# Patient Record
Sex: MALE | Race: WHITE | Employment: FULL TIME | URBAN - METROPOLITAN AREA
[De-identification: names, ages, dates, MRNs, and addresses within clinical notes are randomized per-mention and may not be internally consistent; named-entity substitution may affect disease eponyms.]

---

## 2018-11-24 ENCOUNTER — HOSPITAL ENCOUNTER (EMERGENCY)
Age: 23
Discharge: HOME | End: 2018-11-24
Attending: EMERGENCY MEDICINE

## 2018-11-24 ENCOUNTER — APPOINTMENT (OUTPATIENT)
Dept: GENERAL RADIOLOGY | Age: 23
End: 2018-11-24

## 2018-11-24 VITALS
TEMPERATURE: 98.2 F | DIASTOLIC BLOOD PRESSURE: 84 MMHG | SYSTOLIC BLOOD PRESSURE: 140 MMHG | HEART RATE: 71 BPM | HEIGHT: 71 IN | OXYGEN SATURATION: 99 % | WEIGHT: 221 LBS | BODY MASS INDEX: 30.94 KG/M2

## 2018-11-24 DIAGNOSIS — S60.032A CONTUSION OF LEFT MIDDLE FINGER WITHOUT DAMAGE TO NAIL, INITIAL ENCOUNTER: Primary | ICD-10-CM

## 2018-11-24 PROCEDURE — 6370000000 HC RX 637 (ALT 250 FOR IP): Performed by: PHYSICIAN ASSISTANT

## 2018-11-24 PROCEDURE — 73130 X-RAY EXAM OF HAND: CPT

## 2018-11-24 PROCEDURE — 99283 EMERGENCY DEPT VISIT LOW MDM: CPT

## 2018-11-24 RX ORDER — IBUPROFEN 400 MG/1
800 TABLET ORAL ONCE
Status: COMPLETED | OUTPATIENT
Start: 2018-11-24 | End: 2018-11-24

## 2018-11-24 RX ADMIN — IBUPROFEN 800 MG: 400 TABLET, FILM COATED ORAL at 12:56

## 2018-11-24 ASSESSMENT — PAIN DESCRIPTION - LOCATION: LOCATION: HAND

## 2018-11-24 ASSESSMENT — PAIN DESCRIPTION - ORIENTATION: ORIENTATION: LEFT

## 2018-11-24 ASSESSMENT — PAIN SCALES - GENERAL
PAINLEVEL_OUTOF10: 7
PAINLEVEL_OUTOF10: 7

## 2018-11-24 ASSESSMENT — PAIN DESCRIPTION - FREQUENCY: FREQUENCY: CONTINUOUS

## 2018-11-24 ASSESSMENT — ENCOUNTER SYMPTOMS: COLOR CHANGE: 0

## 2018-11-24 ASSESSMENT — PAIN DESCRIPTION - DESCRIPTORS: DESCRIPTORS: CONSTANT

## 2018-11-24 ASSESSMENT — PAIN DESCRIPTION - PAIN TYPE: TYPE: ACUTE PAIN

## 2018-11-24 NOTE — ED PROVIDER NOTES
ED Attending Attestation Note     Date of evaluation: 11/24/2018    This patient was seen by the advance practice provider. I have seen and examined the patient, agree with the workup, evaluation, management and diagnosis. The care plan has been discussed. My assessment reveals Mild swelling and tenderness of the left third PIP joint. The patient has intact flexion and extension at MCP, PIP, DIP.      Newton Hill MD  11/24/18 1244

## 2021-11-06 ENCOUNTER — APPOINTMENT (OUTPATIENT)
Dept: GENERAL RADIOLOGY | Age: 26
End: 2021-11-06
Payer: COMMERCIAL

## 2021-11-06 ENCOUNTER — HOSPITAL ENCOUNTER (EMERGENCY)
Age: 26
Discharge: HOME | End: 2021-11-06
Payer: COMMERCIAL

## 2021-11-06 VITALS
BODY MASS INDEX: 30.8 KG/M2 | TEMPERATURE: 98 F | DIASTOLIC BLOOD PRESSURE: 74 MMHG | WEIGHT: 220 LBS | OXYGEN SATURATION: 100 % | HEIGHT: 71 IN | RESPIRATION RATE: 16 BRPM | HEART RATE: 75 BPM | SYSTOLIC BLOOD PRESSURE: 127 MMHG
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
## 2021-11-06 DIAGNOSIS — S62.616A CLOSED DISPLACED FRACTURE OF PROXIMAL PHALANX OF RIGHT LITTLE FINGER, INITIAL ENCOUNTER: Primary | ICD-10-CM

## 2021-11-06 PROCEDURE — 73130 X-RAY EXAM OF HAND: CPT

## 2021-11-06 PROCEDURE — 6370000000 HC RX 637 (ALT 250 FOR IP): Performed by: PHYSICIAN ASSISTANT

## 2021-11-06 PROCEDURE — 99283 EMERGENCY DEPT VISIT LOW MDM: CPT

## 2021-11-06 RX ORDER — IBUPROFEN 800 MG/1
800 TABLET ORAL EVERY 8 HOURS PRN
Qty: 30 TABLET | Refills: 0 | Status: SHIPPED | OUTPATIENT
Start: 2021-11-06

## 2021-11-06 RX ORDER — ACETAMINOPHEN 500 MG
1000 TABLET ORAL ONCE
Status: COMPLETED | OUTPATIENT
Start: 2021-11-06 | End: 2021-11-06

## 2021-11-06 RX ORDER — IBUPROFEN 400 MG/1
800 TABLET ORAL ONCE
Status: COMPLETED | OUTPATIENT
Start: 2021-11-06 | End: 2021-11-06

## 2021-11-06 RX ADMIN — IBUPROFEN 800 MG: 400 TABLET, FILM COATED ORAL at 17:23

## 2021-11-06 RX ADMIN — ACETAMINOPHEN 1000 MG: 500 TABLET ORAL at 17:23

## 2021-11-06 ASSESSMENT — ENCOUNTER SYMPTOMS
APNEA: 0
NAUSEA: 0
EYE DISCHARGE: 0
BACK PAIN: 0
ABDOMINAL PAIN: 0
VOMITING: 0
EYE REDNESS: 0
SHORTNESS OF BREATH: 0
CHOKING: 0
FACIAL SWELLING: 0

## 2021-11-06 ASSESSMENT — PAIN DESCRIPTION - PAIN TYPE: TYPE: ACUTE PAIN

## 2021-11-06 ASSESSMENT — PAIN SCALES - GENERAL: PAINLEVEL_OUTOF10: 7
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
## 2021-11-07 NOTE — ED NOTES
Provider order placed for patient's discharge. Provider reviewed decision to discharge with the patient. Discharge paperwork and any prescriptions were reviewed with the patient. Patient verbalized understanding of discharge education and any prescriptions and has no further questions or further needs at this time. Patient left with all personal belongings and was stable upon departure. Patient thanked for choosing [facility] ([facility]) and informed to return should any need arise.          Elizabeth Sinclair RN  11/06/21 2004

## 2021-11-08 ENCOUNTER — TELEPHONE (OUTPATIENT)
Dept: ORTHOPEDIC SURGERY | Age: 26
End: 2021-11-08

## 2021-11-08 NOTE — TELEPHONE ENCOUNTER
Appointment Request     Patient requesting earlier appointment: Yes  Appointment offered to patient: THIS IS A FRACTURE.  NP/ R HAND ([facility]) FX/ XR/ ER VISIT 11/6/21 [facility]/ AUTO RELATED (3RD PARTY RESPONSIBILITY)/ SELF PAY $40   Patient Contact Number: [phone]

## 2021-11-10 ENCOUNTER — OFFICE VISIT (OUTPATIENT)
Dept: ORTHOPEDIC SURGERY | Age: 26
End: 2021-11-10

## 2021-11-10 ENCOUNTER — TELEPHONE (OUTPATIENT)
Dept: ORTHOPEDIC SURGERY | Age: 26
End: 2021-11-10

## 2021-11-10 VITALS — HEIGHT: 71 IN | BODY MASS INDEX: 30.8 KG/M2 | WEIGHT: 220 LBS | RESPIRATION RATE: 15 BRPM

## 2021-11-10 DIAGNOSIS — Z01.818 PRE-OP TESTING: Primary | ICD-10-CM

## 2021-11-10 DIAGNOSIS — S62.616A CLOSED DISPLACED FRACTURE OF PROXIMAL PHALANX OF RIGHT LITTLE FINGER, INITIAL ENCOUNTER: ICD-10-CM

## 2021-11-10 PROCEDURE — 99204 OFFICE O/P NEW MOD 45 MIN: CPT | Performed by: PHYSICIAN ASSISTANT

## 2021-11-10 NOTE — PATIENT INSTRUCTIONS
Pre-Operative Instructions    1. The night before your surgery, unless otherwise instructed, do not eat any food, drink any liquids, chew gum or mints after midnight. Abstain from alcohol for 24 hours prior to surgery. 2. You will be contacted by the Hospital the working day prior to your procedure to confirm your arrival time. 3. Patients under 25years of age must have a parent or legal guardian present to sign their consent and discharge paperwork. 4. On the day of surgery,  you will be seen pre-operatively by an anesthesiologist.     5. If you are having hand surgery, it is recommended that nail polish and acrylic nails be removed prior to surgery if possible. 6. Please bring cases for glasses, contact lenses, hearing aids or dentures. They will likely be removed prior to surgery. 7. Wear casual, loose-fitting and comfortable clothing. Consider that you may have a large dressing to fit under your clothing after surgery. 9. Please do not bring valuables such as jewelry or large sums of cash to the hospital. Remove all body piercings before coming to the hospital. Gab Tang may not  wear any rings on the hand if you are having surgery on that hand, wrist or elbow. 10. Do not smoke or chew tobacco before your surgery. [address] [place] [address] and surgery facilities are smoke-free environments. Smoking is not permitted anywhere on campus. 11. Be sure to follow any additional instructions from your physician. If the above conditions are not met, your surgery may be cancelled and rescheduled for another day. Should you develop any change in your health such as fever, cough, sore throat, cold, flu, or infection, or if you have any questions regarding your Pre-admission or surgery, please contact [address] - Surgery Scheduling at [phone], Monday through Friday, 9 a.m. to 5 p.m.
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
## 2021-11-10 NOTE — LETTER
[address] [facility]  [address]  Phone: [phone]  Fax: [phone]    Jose Miguel Ley PA-C        November 10, 2021     Patient: Alec Kirby   YOB: 1995   Date of Visit: 11/10/2021       To Whom it May Concern:    Angel Lowery was seen in my clinic on 11/10/2021. If you have any questions or concerns, please don't hesitate to call.     Sincerely,         Jose Miguel Ley PA-C

## 2021-11-10 NOTE — LETTER
Recurrent Symptoms  - Anesthetic and/or Medical Risks  - We have discussed the specific limitations and risks of hospital and/or office based treatment at this time due to the COVID-19 pandemic                I have been counseled about the risks of rohit Covid-19 in the christopher-operative and post-operative periods related to this procedure. I have been made aware that rohit Covid-19 around the time of a surgical procedure may worsen my prognosis for recovering from the virus and lend to a higher morbidity and or mortality risk. With this knowledge, I have requested to proceed with the procedure as scheduled. 4. I have also been informed by the informing physician that there are other risks from both known and unknown causes that are attendant to the performance of any surgical procedure. I am aware that the practice of medicine and surgery is not an exact science, and that no guarantees have been made to me concerning the results of the operation and/or procedure(s). 5. I   CONSENT / REFUSE CONSENT  (strike the phrase that does not apply) to the taking of photographs before, during and/or after the operation or procedure for scientific/educational purposes. 6. I consent to the administration of anesthesia and to the use of such anesthetics as may be deemed advisable by the anesthesiologist who has been engaged by me or my physician. 7. I certify that I have read and understand the above consent to operation and/or other procedure(s); that the explanations therein referred to were made to me by the informing physician in advance of my signing this consent; that all blanks or statements requiring insertion or completion were filled in and inapplicable paragraphs, if any, were stricken before I signed; and that all questions asked by me about the operation and/or procedure(s) which I have consented to have been fully answered in a satisfactory manner. _______________________           11/10/21                              Witness     Signature Of Patient         Date        Padmini Mcwilliams                                                 Informing Physician                                           Signature of Informing Physician                              If patient is unable to sign or is a minor, complete one of the following:    (A)  Patient is a minor   years of age. (B)  Patient is unable to sign because: The undersigned represents that he or she is duly authorized to execute this consent for and on behalf of the above named patient. Witness               o  Parent  o  Guardian   o  Spouse       o  Other (specify)                                           Patient Name: Daniella Leger  Patient YOB: 1995  Dr. Chato Vera' Return To Work Policy  Regarding your ability to return to work after surgery or injury, Dr. Chato Vera will not state that any patient is off of work or cannot work at all. He will place you on restrictions after your surgical procedure or injury. Depending on the details of your particular situation, Dr. Chato Vera may state that you will have either light use or no use of your hand for a specific number of weeks. It is your obligation to communicate with your employer regarding your restrictions. It is your employer's decision as to whether they will accommodate your restrictions (i.e. allow you to come to work in your restricted capacity) or to not allow you to return to work under your restrictions. Dr. Chato Vera does not participate in making this decision and cannot influence your employer regarding their decision. If you do not communicate your restrictions to your employer, or if you do not present to work as you are scheduled to, Dr. Chato Vera will not provide an 'excuse' to explain your absence.   A doctors note, or official forms (BWC, FMLA, etc.) will be filled out, upon request, to indicate your date of surgery and your restrictions as stated above. Dr. Nestor Horowitz' Narcotic Policy  Patients will only be prescribed narcotics after surgical procedures or significant injury. Not all procedures cause pain great enough to require Narcotics and thus, not all patients will receive prescriptions after surgical procedures or injuries. Narcotics are never prescribed for chronic conditions. Narcotics are never prescribed for use longer than one week at a time. Refills are only granted in unusual circumstances and only at Dr. José Littlejohn discretion. Patients who are receiving narcotic medication from another physician or who are under pain management contracts will not be given a prescription for narcotics for any reason. Surgery Arrival Time:  You have been advised of the START TIME for your surgery as well as the ARRIVAL TIME at which you need to arrive at the surgery facility. Please understand that there is a certain amount of preparation which takes place at the surgery facility prior to the start of your surgery. If you arrive later than your scheduled ARRIVAL TIME, it may be necessary to cancel your surgery for that day and reschedule your procedure due to lack of adequate time for pre-surgery preparations. Thank you for being on time for your arrival.    I have read the above policies and understand that by agreeing to proceed with treatment by Dr. Annette Fam and his team, that I am agreeing to abide by these policies.   Patient Name:  Tahmina Kern    Patient Signature:  _____________________________    Brittani Locket Date:   11/10/21
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
## 2021-11-10 NOTE — LETTER
[facility] SURGERY  Surgery Scheduling Form:  DEMOGRAPHICS:                                                                                                              .  Patient Name:  Star Patel  Patient :  1995   Patient SS#:  [id number]    Patient Phone:  [phone] (home)  Alt. Patient Phone:    Patient Address:  [address] [address]    PCP:  No primary care provider on file. Insurance:  Payor: / No coverage found. Insurance ID Number:    DIAGNOSIS & PROCEDURE:                                                                                            .  Diagnosis:   right Small Finger Proximal Phalanx Fracture  (816.00)  s62.646a  Operation:  Closed Reduction  And Percutaneous Pinning of right Small Finger Proximal Phalanx Fracture  [CPT: 99948]  Location:  [facility]  Surgeon:  Soco Crawford    SCHEDULING INFORMATION:                                                                                         .    Surgeon's Scheduling Instruction:  within 7 days    RN Post-op Appt:  [] Yes   [x] No  Preferred Thursday:   [] Yes   [] No    Requested Date:  11-15  OR Time:  9:10 Patient Arrival Time:    OR Time Required:  30  Minutes  Anesthesia:  General  Equipment:  K-Wires, TPS  Mini C-Arm:  Yes   Standard C-Arm:  No  Status:  Outpatient                                COVID   RAPID  PAT Required:  Yes  Comments:  Monday 11/15 if still available                     Marshall Luna MD  11/10/21 10:50 AM    BILLING INFORMATION:                                                                                                    .    Procedure:       CPT Code Modifier  Closed Reduction and percutaneous pinning of right small finger and proximal phalanx Fracture    .                          Pre Operative Physician Prophylaxis Orders - SCIP Protocols      Pre-Operative Antibiotic Order:    Allergies   Allergen Reactions    Penicillins Swelling []  ----  No Antibiotic Ordered       [x]  ----  Give the following Antibiotic within 1 hour prior to start time:         Ancef 1 gram IV if patient is less than 200 pounds    or       Ancef 2 grams IV if patient is greater than 200 pounds    or      Vancomycin 1 gram IV (over 1 hour) if patient is allergic to           PENICILLINS or CEFALOSPORINS     surg    11-15                    covid   Rapid                          H&p use ER report       Procedure: Closed Reduction and Percutaneous Pinning of right Small Finger Proximal Phalanx Fracture     Patient: Lara Gilliam  :    1995    Physician Signature:     Date: 11/10/21  Time: 10:50 AM

## 2021-11-11 NOTE — PROGRESS NOTES
C-Difficile admission screening and protocol:     * Admitted with diarrhea? YES____    NO___X__     *Prior history of C-Diff. In last 3 months? YES____   NO__X___     *Antibiotic use in the past 6-8 weeks? NO__X____YES______                 If yes which  ANTIBIOTIC AND REASON______     *Prior hospitalization or nursing home in the last month?  YES____   NO__X__

## 2021-11-11 NOTE — PROGRESS NOTES
[facility] time____0755________        Surgery time_____0925_______    Take the following medications with a sip of water: Follow your MD/Surgeons pre-procedure instructions regarding your medications    Do not eat or drink anything after 12:00 midnight prior to your surgery. This includes water chewing gum, mints and ice chips. You may brush your teeth and gargle the morning of your surgery, but do not swallow the water     Please see your family doctor/pediatrician for a history and physical and/or concerning medications. Bring any test results/reports from your physicians office. If you are under the care of a heart doctor or specialist doctor, please be aware that you may be asked to them for clearance    You may be asked to stop blood thinners such as Coumadin, Plavix, Fragmin, Lovenox, etc., or any anti-inflammatories such as:  Aspirin, Ibuprofen, Advil, Naproxen prior to your surgery. We also ask that you stop any OTC medications such as fish oil, vitamin E, glucosamine, garlic, Multivitamins, COQ 10, etc.    We ask that you do not smoke 24 hours prior to surgery  We ask that you do not  drink any alcoholic beverages 24 hours prior to surgery     You must make arrangements for a responsible adult to take you home after your surgery. For your safety you will not be allowed to leave alone or drive yourself home. Your surgery will be cancelled if you do not have a ride home. Also for your safety, it is strongly suggested that someone stay with you the first 24 hours after your surgery. A parent or legal guardian must accompany a child scheduled for surgery and plan to stay at the hospital until the child is discharged. Please do not bring other children with you. For your comfort, please wear simple loose fitting clothing to the hospital.  Please do not bring valuables.     Do not wear any make-up or nail polish on your fingers or

## 2021-11-11 NOTE — PROGRESS NOTES
Pt allergic to PCN, Ancef ordered, consulted with Pharmacy regarding order and was instructed to order the alternative antibiotic Vancomycin since patient states \"just remember swelling up in fourth grade\"--unable to recall any other reactions

## 2021-11-12 ENCOUNTER — ANESTHESIA EVENT (OUTPATIENT)
Dept: OPERATING ROOM | Age: 26
End: 2021-11-12

## 2021-11-15 ENCOUNTER — ANESTHESIA (OUTPATIENT)
Dept: OPERATING ROOM | Age: 26
End: 2021-11-15

## 2021-11-15 ENCOUNTER — HOSPITAL ENCOUNTER (OUTPATIENT)
Age: 26
Setting detail: OUTPATIENT SURGERY
Discharge: HOME | End: 2021-11-15
Attending: ORTHOPAEDIC SURGERY | Admitting: ORTHOPAEDIC SURGERY

## 2021-11-15 ENCOUNTER — APPOINTMENT (OUTPATIENT)
Dept: GENERAL RADIOLOGY | Age: 26
End: 2021-11-15
Attending: ORTHOPAEDIC SURGERY

## 2021-11-15 VITALS
TEMPERATURE: 96.8 F | DIASTOLIC BLOOD PRESSURE: 66 MMHG | SYSTOLIC BLOOD PRESSURE: 122 MMHG | RESPIRATION RATE: 2 BRPM | OXYGEN SATURATION: 100 %

## 2021-11-15 VITALS
TEMPERATURE: 98.1 F | RESPIRATION RATE: 16 BRPM | DIASTOLIC BLOOD PRESSURE: 77 MMHG | WEIGHT: 210.1 LBS | BODY MASS INDEX: 29.41 KG/M2 | SYSTOLIC BLOOD PRESSURE: 123 MMHG | OXYGEN SATURATION: 96 % | HEART RATE: 60 BPM | HEIGHT: 71 IN

## 2021-11-15 DIAGNOSIS — S62.646A CLOSED NONDISPLACED FRACTURE OF PROXIMAL PHALANX OF RIGHT LITTLE FINGER, INITIAL ENCOUNTER: Primary | ICD-10-CM

## 2021-11-15 LAB — SARS-COV-2, NAAT: NOT DETECTED

## 2021-11-15 PROCEDURE — C1713 ANCHOR/SCREW BN/BN,TIS/BN: HCPCS | Performed by: ORTHOPAEDIC SURGERY

## 2021-11-15 PROCEDURE — 73120 X-RAY EXAM OF HAND: CPT

## 2021-11-15 PROCEDURE — 26746 TREAT FINGER FRACTURE EACH: CPT | Performed by: ORTHOPAEDIC SURGERY

## 2021-11-15 PROCEDURE — 6360000002 HC RX W HCPCS: Performed by: ANESTHESIOLOGY

## 2021-11-15 PROCEDURE — 2500000003 HC RX 250 WO HCPCS: Performed by: ORTHOPAEDIC SURGERY

## 2021-11-15 PROCEDURE — 7100000000 HC PACU RECOVERY - FIRST 15 MIN: Performed by: ORTHOPAEDIC SURGERY

## 2021-11-15 PROCEDURE — 3700000001 HC ADD 15 MINUTES (ANESTHESIA): Performed by: ORTHOPAEDIC SURGERY

## 2021-11-15 PROCEDURE — 3600000015 HC SURGERY LEVEL 5 ADDTL 15MIN: Performed by: ORTHOPAEDIC SURGERY

## 2021-11-15 PROCEDURE — 87635 SARS-COV-2 COVID-19 AMP PRB: CPT

## 2021-11-15 PROCEDURE — 3700000000 HC ANESTHESIA ATTENDED CARE: Performed by: ORTHOPAEDIC SURGERY

## 2021-11-15 PROCEDURE — 2500000003 HC RX 250 WO HCPCS: Performed by: NURSE ANESTHETIST, CERTIFIED REGISTERED

## 2021-11-15 PROCEDURE — 7100000011 HC PHASE II RECOVERY - ADDTL 15 MIN: Performed by: ORTHOPAEDIC SURGERY

## 2021-11-15 PROCEDURE — 2709999900 HC NON-CHARGEABLE SUPPLY: Performed by: ORTHOPAEDIC SURGERY

## 2021-11-15 PROCEDURE — 6360000002 HC RX W HCPCS: Performed by: NURSE ANESTHETIST, CERTIFIED REGISTERED

## 2021-11-15 PROCEDURE — 7100000001 HC PACU RECOVERY - ADDTL 15 MIN: Performed by: ORTHOPAEDIC SURGERY

## 2021-11-15 PROCEDURE — 3209999900 FLUORO FOR SURGICAL PROCEDURES

## 2021-11-15 PROCEDURE — 3600000005 HC SURGERY LEVEL 5 BASE: Performed by: ORTHOPAEDIC SURGERY

## 2021-11-15 PROCEDURE — 2580000003 HC RX 258: Performed by: ANESTHESIOLOGY

## 2021-11-15 PROCEDURE — 7100000010 HC PHASE II RECOVERY - FIRST 15 MIN: Performed by: ORTHOPAEDIC SURGERY

## 2021-11-15 PROCEDURE — 2580000003 HC RX 258: Performed by: ORTHOPAEDIC SURGERY

## 2021-11-15 PROCEDURE — 6360000002 HC RX W HCPCS: Performed by: ORTHOPAEDIC SURGERY

## 2021-11-15 DEVICE — IMPLANTABLE DEVICE: Type: IMPLANTABLE DEVICE | Site: HAND | Status: FUNCTIONAL

## 2021-11-15 RX ORDER — FENTANYL CITRATE 50 UG/ML
50 INJECTION, SOLUTION INTRAMUSCULAR; INTRAVENOUS EVERY 5 MIN PRN
Status: DISCONTINUED | OUTPATIENT
Start: 2021-11-15 | End: 2021-11-15 | Stop reason: HOSPADM

## 2021-11-15 RX ORDER — OXYCODONE HYDROCHLORIDE 5 MG/1
5 TABLET ORAL PRN
Status: DISCONTINUED | OUTPATIENT
Start: 2021-11-15 | End: 2021-11-15 | Stop reason: HOSPADM

## 2021-11-15 RX ORDER — MORPHINE SULFATE 2 MG/ML
1 INJECTION, SOLUTION INTRAMUSCULAR; INTRAVENOUS EVERY 5 MIN PRN
Status: DISCONTINUED | OUTPATIENT
Start: 2021-11-15 | End: 2021-11-15 | Stop reason: HOSPADM

## 2021-11-15 RX ORDER — SODIUM CHLORIDE 0.9 % (FLUSH) 0.9 %
10 SYRINGE (ML) INJECTION EVERY 12 HOURS SCHEDULED
Status: DISCONTINUED | OUTPATIENT
Start: 2021-11-15 | End: 2021-11-15 | Stop reason: HOSPADM

## 2021-11-15 RX ORDER — DEXAMETHASONE SODIUM PHOSPHATE 4 MG/ML
4 INJECTION, SOLUTION INTRA-ARTICULAR; INTRALESIONAL; INTRAMUSCULAR; INTRAVENOUS; SOFT TISSUE ONCE
Status: COMPLETED | OUTPATIENT
Start: 2021-11-15 | End: 2021-11-15

## 2021-11-15 RX ORDER — ONDANSETRON 2 MG/ML
4 INJECTION INTRAMUSCULAR; INTRAVENOUS
Status: DISCONTINUED | OUTPATIENT
Start: 2021-11-15 | End: 2021-11-15 | Stop reason: HOSPADM

## 2021-11-15 RX ORDER — MORPHINE SULFATE 2 MG/ML
2 INJECTION, SOLUTION INTRAMUSCULAR; INTRAVENOUS EVERY 5 MIN PRN
Status: DISCONTINUED | OUTPATIENT
Start: 2021-11-15 | End: 2021-11-15 | Stop reason: HOSPADM

## 2021-11-15 RX ORDER — FENTANYL CITRATE 50 UG/ML
INJECTION, SOLUTION INTRAMUSCULAR; INTRAVENOUS PRN
Status: DISCONTINUED | OUTPATIENT
Start: 2021-11-15 | End: 2021-11-15 | Stop reason: SDUPTHER

## 2021-11-15 RX ORDER — SODIUM CHLORIDE 0.9 % (FLUSH) 0.9 %
10 SYRINGE (ML) INJECTION PRN
Status: DISCONTINUED | OUTPATIENT
Start: 2021-11-15 | End: 2021-11-15 | Stop reason: HOSPADM

## 2021-11-15 RX ORDER — DIPHENHYDRAMINE HYDROCHLORIDE 50 MG/ML
25 INJECTION INTRAMUSCULAR; INTRAVENOUS ONCE
Status: COMPLETED | OUTPATIENT
Start: 2021-11-15 | End: 2021-11-15

## 2021-11-15 RX ORDER — PROPOFOL 10 MG/ML
INJECTION, EMULSION INTRAVENOUS PRN
Status: DISCONTINUED | OUTPATIENT
Start: 2021-11-15 | End: 2021-11-15 | Stop reason: SDUPTHER

## 2021-11-15 RX ORDER — DEXAMETHASONE SODIUM PHOSPHATE 4 MG/ML
INJECTION, SOLUTION INTRA-ARTICULAR; INTRALESIONAL; INTRAMUSCULAR; INTRAVENOUS; SOFT TISSUE PRN
Status: DISCONTINUED | OUTPATIENT
Start: 2021-11-15 | End: 2021-11-15 | Stop reason: SDUPTHER

## 2021-11-15 RX ORDER — MEPERIDINE HYDROCHLORIDE 25 MG/ML
12.5 INJECTION INTRAMUSCULAR; INTRAVENOUS; SUBCUTANEOUS EVERY 5 MIN PRN
Status: DISCONTINUED | OUTPATIENT
Start: 2021-11-15 | End: 2021-11-15 | Stop reason: HOSPADM

## 2021-11-15 RX ORDER — ONDANSETRON 2 MG/ML
INJECTION INTRAMUSCULAR; INTRAVENOUS PRN
Status: DISCONTINUED | OUTPATIENT
Start: 2021-11-15 | End: 2021-11-15 | Stop reason: SDUPTHER

## 2021-11-15 RX ORDER — OXYCODONE HYDROCHLORIDE 10 MG/1
10 TABLET ORAL PRN
Status: DISCONTINUED | OUTPATIENT
Start: 2021-11-15 | End: 2021-11-15 | Stop reason: HOSPADM

## 2021-11-15 RX ORDER — LIDOCAINE HYDROCHLORIDE 20 MG/ML
INJECTION, SOLUTION EPIDURAL; INFILTRATION; INTRACAUDAL; PERINEURAL PRN
Status: DISCONTINUED | OUTPATIENT
Start: 2021-11-15 | End: 2021-11-15 | Stop reason: SDUPTHER

## 2021-11-15 RX ORDER — SODIUM CHLORIDE 9 MG/ML
25 INJECTION, SOLUTION INTRAVENOUS PRN
Status: DISCONTINUED | OUTPATIENT
Start: 2021-11-15 | End: 2021-11-15 | Stop reason: HOSPADM

## 2021-11-15 RX ORDER — FENTANYL CITRATE 50 UG/ML
25 INJECTION, SOLUTION INTRAMUSCULAR; INTRAVENOUS EVERY 5 MIN PRN
Status: DISCONTINUED | OUTPATIENT
Start: 2021-11-15 | End: 2021-11-15 | Stop reason: HOSPADM

## 2021-11-15 RX ORDER — BUPIVACAINE HYDROCHLORIDE 5 MG/ML
INJECTION, SOLUTION EPIDURAL; INTRACAUDAL
Status: COMPLETED | OUTPATIENT
Start: 2021-11-15 | End: 2021-11-15

## 2021-11-15 RX ORDER — MAGNESIUM HYDROXIDE 1200 MG/15ML
LIQUID ORAL CONTINUOUS PRN
Status: COMPLETED | OUTPATIENT
Start: 2021-11-15 | End: 2021-11-15

## 2021-11-15 RX ORDER — HYDROCODONE BITARTRATE AND ACETAMINOPHEN 5; 325 MG/1; MG/1
1 TABLET ORAL EVERY 6 HOURS PRN
Qty: 20 TABLET | Refills: 0 | Status: SHIPPED | OUTPATIENT
Start: 2021-11-15 | End: 2021-11-22

## 2021-11-15 RX ORDER — SODIUM CHLORIDE 9 MG/ML
INJECTION, SOLUTION INTRAVENOUS CONTINUOUS
Status: DISCONTINUED | OUTPATIENT
Start: 2021-11-15 | End: 2021-11-15 | Stop reason: HOSPADM

## 2021-11-15 RX ADMIN — PROPOFOL 100 MG: 10 INJECTION, EMULSION INTRAVENOUS at 08:57

## 2021-11-15 RX ADMIN — ONDANSETRON 4 MG: 2 INJECTION INTRAMUSCULAR; INTRAVENOUS at 08:53

## 2021-11-15 RX ADMIN — SODIUM CHLORIDE: 9 INJECTION, SOLUTION INTRAVENOUS at 08:11

## 2021-11-15 RX ADMIN — FENTANYL CITRATE 50 MCG: 50 INJECTION INTRAMUSCULAR; INTRAVENOUS at 08:54

## 2021-11-15 RX ADMIN — PROPOFOL 200 MG: 10 INJECTION, EMULSION INTRAVENOUS at 08:50

## 2021-11-15 RX ADMIN — DEXAMETHASONE SODIUM PHOSPHATE 4 MG: 4 INJECTION, SOLUTION INTRAMUSCULAR; INTRAVENOUS at 09:43

## 2021-11-15 RX ADMIN — DIPHENHYDRAMINE HYDROCHLORIDE 25 MG: 50 INJECTION, SOLUTION INTRAMUSCULAR; INTRAVENOUS at 09:38

## 2021-11-15 RX ADMIN — LIDOCAINE HYDROCHLORIDE 60 MG: 20 INJECTION, SOLUTION EPIDURAL; INFILTRATION; INTRACAUDAL; PERINEURAL at 08:50

## 2021-11-15 RX ADMIN — Medication 1500 MG: at 08:11

## 2021-11-15 RX ADMIN — DEXAMETHASONE SODIUM PHOSPHATE 8 MG: 4 INJECTION, SOLUTION INTRAMUSCULAR; INTRAVENOUS at 08:53

## 2021-11-15 RX ADMIN — FENTANYL CITRATE 50 MCG: 50 INJECTION INTRAMUSCULAR; INTRAVENOUS at 08:50

## 2021-11-15 ASSESSMENT — PAIN DESCRIPTION - ORIENTATION
ORIENTATION: RIGHT

## 2021-11-15 ASSESSMENT — PULMONARY FUNCTION TESTS
PIF_VALUE: 17
PIF_VALUE: 2
PIF_VALUE: 12
PIF_VALUE: 0
PIF_VALUE: 12
PIF_VALUE: 15
PIF_VALUE: 20
PIF_VALUE: 12
PIF_VALUE: 11
PIF_VALUE: 12
PIF_VALUE: 2
PIF_VALUE: 12
PIF_VALUE: 12
PIF_VALUE: 1
PIF_VALUE: 0
PIF_VALUE: 15
PIF_VALUE: 14
PIF_VALUE: 13

## 2021-11-15 ASSESSMENT — PAIN SCALES - GENERAL
PAINLEVEL_OUTOF10: 8
PAINLEVEL_OUTOF10: 6

## 2021-11-15 ASSESSMENT — PAIN DESCRIPTION - PAIN TYPE
TYPE: SURGICAL PAIN

## 2021-11-15 ASSESSMENT — PAIN DESCRIPTION - ONSET: ONSET: ON-GOING

## 2021-11-15 ASSESSMENT — PAIN - FUNCTIONAL ASSESSMENT: PAIN_FUNCTIONAL_ASSESSMENT: 0-10

## 2021-11-15 ASSESSMENT — PAIN DESCRIPTION - LOCATION
LOCATION: FINGER (COMMENT WHICH ONE)

## 2021-11-15 ASSESSMENT — PAIN DESCRIPTION - FREQUENCY: FREQUENCY: CONTINUOUS

## 2021-11-15 ASSESSMENT — PAIN DESCRIPTION - DESCRIPTORS: DESCRIPTORS: OTHER (COMMENT)

## 2021-11-15 ASSESSMENT — PAIN DESCRIPTION - PROGRESSION: CLINICAL_PROGRESSION: NOT CHANGED

## 2021-11-15 NOTE — H&P
Pre-operative Update of H&P:    I  have seen & examined Mr. Compa Santamaria related solely to his hand and upper extremity conditions, prior to the scheduled procedure on the date of his surgery. The indications for the planned surgical procedure & and his upper-extremity condition are unchanged.

## 2021-11-15 NOTE — PROGRESS NOTES
CLINICAL PHARMACY NOTE: MEDS TO BEDS    Total # of Prescriptions Filled: 1   The following medications were delivered to the patient:  Current Discharge Medication List      START taking these medications    Details   HYDROcodone-acetaminophen (NORCO) 5-325 MG per tablet Take 1 tablet by mouth every 6 hours as needed for Pain for up to 7 days.   Qty: 20 tablet, Refills: 0    Comments: Reduce doses taken as pain becomes manageable  Associated Diagnoses: Closed nondisplaced fracture of proximal phalanx of right little finger, initial encounter         ·   ·     Additional Documentation:

## 2021-11-15 NOTE — ANESTHESIA POSTPROCEDURE EVALUATION
Department of Anesthesiology  Postprocedure Note    Patient: Tiffanie Rea  MRN: [id number]  YOB: 1995  Date of evaluation: 11/15/2021  Time:  12:48 PM     Procedure Summary     Date: 11/15/21 Room / Location: [address] [place] [address]    Anesthesia Start: 2377 Anesthesia Stop: 0507    Procedure: CLOSED REDUCTION AND PERCUTANEOUS PINNING OF RIGHT SMALL FINGER PROXIMAL PHALANX FRACTURE (Right Hand) Diagnosis: (RIGHT SMALL FINGER PROXIMAL PHALANX FRACTURE)    Surgeons: Shira Crouch MD Responsible Provider: Jerry Yañez MD    Anesthesia Type: general ASA Status: 2          Anesthesia Type: general    Iliana Phase I: Iliana Score: 10    Iliana Phase II: Iliana Score: 10    Last vitals: Reviewed and per EMR flowsheets.        Anesthesia Post Evaluation    Patient location during evaluation: PACU  Patient participation: complete - patient participated  Level of consciousness: awake and alert  Pain score: 0  Airway patency: patent  Nausea & Vomiting: no nausea and no vomiting  Complications: no  Cardiovascular status: blood pressure returned to baseline  Respiratory status: acceptable  Hydration status: euvolemic  Comments: Has some itching in PACU, resolved with benadryl and decadron  Multimodal analgesia pain management approach

## 2021-11-15 NOTE — PROGRESS NOTES
Pt c/o itching and is red all over face, torso and arms. Pt has recd vancomycin IVPB which is already finished. Notified Dr. Apryl Omalley and recd order for benadryl 25mg IVP and decadron 4mg ivp.

## 2021-11-15 NOTE — ANESTHESIA PRE PROCEDURE
Department of Anesthesiology  Preprocedure Note       Name:  Tahmina Kern   Age:  31 y.o.  :  1995                                          MRN:  [id number]         Date:  11/15/2021      Surgeon: Shila Edmond):  Anselmo Garcia MD    Procedure: Procedure(s):  CLOSED REDUCTION AND PERCUTANEOUS PINNING OF RIGHT SMALL FINGER PROXIMAL PHALANX FRACTURE    Medications prior to admission:   Prior to Admission medications    Medication Sig Start Date End Date Taking? Authorizing Provider   ibuprofen (ADVIL;MOTRIN) 800 MG tablet Take 1 tablet by mouth every 8 hours as needed for Pain 21  Yes Ciera Sorto PA-C       Current medications:    Current Facility-Administered Medications   Medication Dose Route Frequency Provider Last Rate Last Admin    0.9 % sodium chloride infusion   IntraVENous Continuous Antonio Benítez MD        sodium chloride flush 0.9 % injection 10 mL  10 mL IntraVENous 2 times per day Antonio Benítez MD        sodium chloride flush 0.9 % injection 10 mL  10 mL IntraVENous PRN Antonio Benítez MD        0.9 % sodium chloride infusion  25 mL IntraVENous PRN Antonio Benítez MD        vancomycin (VANCOCIN) 1500 mg in dextrose 5 % 250 mL IVPB  1,500 mg IntraVENous Once Anselmo Garcia MD           Allergies:     Allergies   Allergen Reactions    Penicillins Swelling       Problem List:    Patient Active Problem List   Diagnosis Code    Pharyngitis J02.9    Acne L70.9    Acne vulgaris L70.0    Acne L70.9    Boxer's metacarpal fracture, neck, closed S62.082A       Past Medical History:        Diagnosis Date    Acne     Anesthesia     pt states gets anxious with mask     Osteochondrosis, juvenile 2006    left       Past Surgical History:        Procedure Laterality Date    KNEE ARTHROSCOPY  2006    Left    TONSILLECTOMY AND ADENOIDECTOMY      TONSILLECTOMY AND ADENOIDECTOMY      TYMPANOSTOMY TUBE PLACEMENT      multiple times       Social History:    Social History     Tobacco Use    Smoking status: Former Smoker     Packs/day: 0.50     Years: 1.00     Pack years: 0.50     Types: Cigarettes    Smokeless tobacco: Never Used   Substance Use Topics    Alcohol use: No                                Counseling given: Not Answered      Vital Signs (Current):   Vitals:    11/11/21 1632 11/15/21 0752   BP:  132/80   Pulse:  80   Resp:  16   Temp:  97.3 °F (36.3 °C)   TempSrc:  Temporal   SpO2:  98%   Weight: 220 lb (99.8 kg) 210 lb 1.6 oz (95.3 kg)   Height: 5' 11\" (1.803 m) 5' 11\" (1.803 m)                                              BP Readings from Last 3 Encounters:   11/15/21 132/80   11/06/21 127/74   11/24/18 (!) 140/84       NPO Status: Time of last liquid consumption: 2300                        Time of last solid consumption: 2200                        Date of last liquid consumption: 11/14/21                        Date of last solid food consumption: 11/14/21    BMI:   Wt Readings from Last 3 Encounters:   11/15/21 210 lb 1.6 oz (95.3 kg)   11/10/21 220 lb (99.8 kg)   11/06/21 220 lb (99.8 kg)     Body mass index is 29.3 kg/m². CBC:   Lab Results   Component Value Date    WBC 7.0 03/11/2015    RBC 4.81 03/11/2015    HGB 14.3 03/11/2015    HCT 42.3 03/11/2015    MCV 88.0 03/11/2015    RDW 13.4 03/11/2015     03/11/2015       CMP:   Lab Results   Component Value Date    CO2 25 03/11/2015    CREATININE 0.9 03/11/2015    GFRAA >60 03/11/2015    LABGLOM >60 03/11/2015    PROT 7.4 03/11/2015    BILITOT 1.0 03/11/2015    ALKPHOS 73 03/11/2015    AST 19 03/11/2015    ALT 11 03/11/2015       POC Tests: No results for input(s): POCGLU, POCNA, POCK, POCCL, POCBUN, POCHEMO, POCHCT in the last 72 hours.     Coags: No results found for: PROTIME, INR, APTT    HCG (If Applicable): No results found for: PREGTESTUR, PREGSERUM, HCG, HCGQUANT     ABGs: No results found for: PHART, PO2ART, CLM9OFS, LTG3GUA, BEART, A3SNBCBT     Type & Screen (If Applicable):  No results found for: LABABO, [address]    Drug/Infectious Status (If Applicable):  No results found for: HIV, HEPCAB    COVID-19 Screening (If Applicable): No results found for: COVID19        Anesthesia Evaluation  Patient summary reviewed and Nursing notes reviewed  Airway: Mallampati: II  TM distance: >3 FB   Neck ROM: full  Mouth opening: > = 3 FB Dental: normal exam         Pulmonary:Negative Pulmonary ROS                              Cardiovascular:Negative CV ROS  Exercise tolerance: good (>4 METS),           Rhythm: regular                      Neuro/Psych:   Negative Neuro/Psych ROS              GI/Hepatic/Renal: Neg GI/Hepatic/Renal ROS            Endo/Other:    (+) no malignancy/cancer. (-) diabetes mellitus, hypothyroidism, hyperthyroidism, blood dyscrasia, arthritis, no electrolyte abnormalities, no malignancy/cancer                ROS comment: Osteochondrosis, Abdominal:             Vascular: negative vascular ROS. Other Findings:           Anesthesia Plan      general     ASA 2       Induction: intravenous. MIPS: Postoperative opioids intended and Prophylactic antiemetics administered. Anesthetic plan and risks discussed with patient. Plan discussed with CRNA. Barbara Pereira MD   11/15/2021        This pre-anesthesia assessment may be used as a history and physical.    DOS STAFF ADDENDUM:    Pt seen and examined, chart reviewed (including anesthesia, drug and allergy history). No interval changes to history and physical examination. Anesthetic plan, risks, benefits, alternatives, and personnel involved discussed with patient. Patient verbalized an understanding and agrees to proceed.       Barbara Pereira MD  November 15, 2021  7:59 AM

## 2021-11-15 NOTE — PROGRESS NOTES
Pt is awake just slightly drowsy @ times, VSS on RA, no itching and redness has subsided, Dr. Jakub Watt ok to send pt to Phase II & pt is agreeable. Transfer to Phase II.

## 2021-11-15 NOTE — PROGRESS NOTES
Pt is slightly drowsy but conversing, itching has subsided and redness has decreased. Pt does not want any IVP pain meds, pain is tolerable. VSS on RA.

## 2021-11-15 NOTE — OP NOTE
OPERATIVE REPORT          Patient:  Richard Mar    YOB: 1995  Date of Service:  11/15/2021    Location:  [address] OR    Preoperative Diagnosis:  Right Small Finger Proximal Phalanx Base unstable fracture      Postoperative Diagnosis:  Same      Procedure:  Closed reduction and percutaneous pinning of Right Small Finger Proximal Phalanx intra-articular base fracture    Surgeon:    Richard Hairston. Dahlia Dorman MD    Surgical Assistant:    CORINA Watson Assistant    Anesthesia:  General         Blood Loss: Minimal    Complications: None                                                           Indications:  Mr. Richard Mar  is a 31y.o. year old male with a Right Small Finger Proximal Phalanx Base fracture which is unstable. I have discussed preoperatively with him  the complications, limitations, expectations, alternatives, and risks of surgical care. Mr. Richard Mar has provided written informed consent to proceed. Procedure: After written consent was obtained and the proper site was identified and marked, Mr. Richard Mar  was brought to the operating room and placed in the supine position on the operating room table. The Right arm was extended upon a hand table. A dose of preoperative antibiotics was administered and the Right upper extremity was prepped and draped in the usual sterile fashion. Under fluoroscopic guidance, a closed reduction of the Proximal Phalanx fracture was performed. two 0.045\" K-wires were percutaneously placed securing the fracture fragments in anatomic alignment, assuring that there was no distraction or malrotation. Final fluoroscopic images demonstrated anatomic alignment and appropriate reduction of the fracture fragments. The K-wires were checked for appropriate position and dimension. The wire ends were bent, cut short, & protective caps were applied. Local anesthestic was instilled for post-operative analgesia.  The pin sites were dressed with Adaptic, dry sterile dressings, and a very well padded short arm fiberglass cast was applied incorporating the Ring Finger and Small Finger in an intrinsic safe position was applied. Mr. Gary Nowak  was awakened from anesthesia without apparent complication and was returned to the recovery room in stable condition. At the conclusion of the procedure, all needles, instruments, and sponge counts were correct. Chloe Brown MD   11/15/2021 , 9:09 AM

## 2021-11-15 NOTE — PROGRESS NOTES
Ambulatory Surgery/Procedure Discharge Note    Vitals:    11/15/21 1036   BP: 123/77   Pulse: 60   Resp: 16   Temp: 98.1 °F (36.7 °C)   SpO2: 96%       In: 200 [I.V.:200]  Out: -     Restroom use offered before discharge. Yes    Pain assessment:  present - adequately treated  Pain Level: 6    Discharge instructions given to patient's mom. Verbalizes understanding. Protective shower sleeve given. Patient discharged to home/self care.  Patient discharged via wheel chair by transporter to waiting family/S.O.       11/15/2021 10:38 AM

## 2021-11-15 NOTE — PROGRESS NOTES
Admitted to PACU 13 from OR, pt sedate with oral airway intact, VSS on 3L O2 NC. Report recd from anesthesia.

## 2021-11-17 ENCOUNTER — TELEPHONE (OUTPATIENT)
Dept: ORTHOPEDIC SURGERY | Age: 26
End: 2021-11-17

## 2021-11-17 NOTE — TELEPHONE ENCOUNTER
General Question     Subject: Patient call and asked if he can get an Doctor's Note with restrictions and to see if you could e-mail it to him the e-mail: [email or web address] or just mail to his home address that is on file.   Patient: Jamilah Akins  Contact Number: [phone]

## 2021-11-22 ENCOUNTER — OFFICE VISIT (OUTPATIENT)
Dept: ORTHOPEDIC SURGERY | Age: 26
End: 2021-11-22

## 2021-11-22 VITALS — BODY MASS INDEX: 29.4 KG/M2 | HEIGHT: 71 IN | WEIGHT: 210 LBS | RESPIRATION RATE: 16 BRPM

## 2021-11-22 DIAGNOSIS — S62.616A CLOSED DISPLACED FRACTURE OF PROXIMAL PHALANX OF RIGHT LITTLE FINGER, INITIAL ENCOUNTER: Primary | ICD-10-CM

## 2021-11-22 PROCEDURE — 99024 POSTOP FOLLOW-UP VISIT: CPT | Performed by: PHYSICIAN ASSISTANT

## 2021-11-22 NOTE — LETTER
[facility]  [address] [address]  Phone: [phone]  Fax: [phone]    Surendra Buck        November 22, 2021     Patient: Sue Grigsby   YOB: 1995   Date of Visit: 11/22/2021       To Whom it May Concern:    Bayron Tim was seen in my clinic on 11/22/2021. If you have any questions or concerns, please don't hesitate to call.     Sincerely,         Dennise Bailey PA-C

## 2021-11-22 NOTE — PROGRESS NOTES
Mr. Mayi Pat returns today in follow-up of his recent right Small Finger Proximal Phalanx Fracture Repair which was done 1 week ago. He has noted decreased discomfort and decreased swelling. He notes no symptoms of numbness, tingling, no symptoms related to perfusion. Physical Exam:  Skin incisions & pin sites are healing well, without erythema, drainage or sign of infection. Digital range of motion is not assessed due to the presence of the un-healed fracture. Wrist range of motion is mildly stiff from immobilization. Sensation is normal in the Small Finger. Vascular examination reveals normal, good capillary refill and good color. Swelling is minimal.  Clinical alignment and rotation are normal.      Radiographic Evaluation:  Radiographs were obtained today (2 views of the right  Small Finger). They demonstrate good maintenance of alignment of the fracture fragments, no rotational deformity, & mild amount of interval healing of the fracture. There has not been evidence of hardware loosening or failure. The fracture does not appear to be healed at this time. Impression:  Mr. Mayi Pat is doing well after recent right Small Finger  Proximal Phalanx  Fracture Repair. It would appear that he has not fully healed his fracture. Plan:  Mr. Mayi Pat is today returned to an appropriately applied well padded cast incorporating the necessary digits in an intrinsic-safe position. He is advised regarding the appropriate care of, wear, and use of this device. He is also instructed in  work on Active & Passive range of motion of the exposed digits & elbow. These modalities were demonstrated to him today. We discussed the continued restrictions on the use of the injured hand & wrist and the limitations on resumption of activities. We discussed the option of pursuing formalized hand therapy and a prescription  was not indicated.     I have asked Mr. Mayi Pat to follow-up with me in approximately 3 weeks from now for re-evaluation and repeat radiographs out of splint/cast.      He is also specifically instructed to return to the office or call for an appointment sooner if his symptoms are changing or worsening prior to that time.

## 2021-12-17 ENCOUNTER — OFFICE VISIT (OUTPATIENT)
Dept: ORTHOPEDIC SURGERY | Age: 26
End: 2021-12-17

## 2021-12-17 VITALS — WEIGHT: 210 LBS | HEIGHT: 71 IN | RESPIRATION RATE: 16 BRPM | BODY MASS INDEX: 29.4 KG/M2

## 2021-12-17 DIAGNOSIS — S62.616A CLOSED DISPLACED FRACTURE OF PROXIMAL PHALANX OF RIGHT LITTLE FINGER, INITIAL ENCOUNTER: Primary | ICD-10-CM

## 2021-12-17 PROCEDURE — 99024 POSTOP FOLLOW-UP VISIT: CPT | Performed by: PHYSICIAN ASSISTANT

## 2021-12-17 NOTE — PROGRESS NOTES
Mr. Sue Grigsby returns today in follow-up of his recent right Small Finger Metacarpal  Fracture Repair which was done 4 weeks ago. He has noted decreased discomfort and decreased swelling. He notes mild symptoms of numbness, tingling, no symptoms related to perfusion. Physical Exam:  Skin incisions & pin sites are healing well, without erythema, drainage or sign of infection. Digital range of motion is stiff from immobilization to protect the recent fracture. Wrist range of motion is mildly stiff from immobilization. Sensation is normal in the Whole Hand. Vascular examination reveals normal, good capillary refill and good color. Swelling is mild. Clinical alignment and rotation are normal.  Fracture site shows no tenderness to palpation. Radiographic Evaluation:  Radiographs were obtained today (3 views of the right hand). They demonstrate good maintenance of alignment of the fracture fragments, no rotational deformity, & moderate amount of interval healing of the fracture. There has not been evidence of hardware loosening or failure. The fracture does appear to be healed at this time. Impression:  Mr. Sue Grigsby is doing well after recent right Small Finger Metacarpal Fracture Repair. It would appear that he has fully healed his fracture. Plan:  Mr. Sue Grigsby has healed his fracture at this time. His Pecutaneous Pins were removed today without difficulty. With his consent, the area surrounding the pins was sterily prepped and the pins were carefully removed. There was minimal bleeding which was easily controlled. A dry sterile dressing was applied, He tolerated the procedure without difficulty. Mr. Sue Grigsby was given instructions regarding pin site care and maintenance. He is today is released from his immobilization & provided a protective splint and instructions on the appropriate care of, wear, and use of this device.    He is also instructed in  work on Active & Passive range of motion of the digits, wrist, & elbow. These modalities were demonstrated to him today. We discussed the gradual return to use of the injured hand & wrist and the cautious resumption of activities. We discussed the option of pursuing formalized hand therapy and a prescription  was not indicated. I have asked Mr. Ender Patrick to follow-up with me in approximately 4 weeks from now for re-evaluation, unless he has regained full and painless range of motion and use of the injured hand. He is also specifically instructed to return to the office or call for an appointment sooner if his symptoms are changing or worsening prior to that time.

## 2021-12-17 NOTE — PATIENT INSTRUCTIONS
Hand Range of Motion Instructions      Dr. Becky Lyn    1. Be cautions in resuming fulll activities and use of the hand for next 2 - 4 weeks. 2. Perform the following exercises VIGOROUSLY at least four times a day. Exercises should be performed in the seated position with elbow on tabletop or other firm surface. If you cannot make these motions on your own, you may use other hand to assist in making these motions. A. Fully straighten fingers until hand is flat. Fully bend fingers until hand is in a full fist.   B. Bend wrist forward and backward (grasp hand around knuckles with other hand to do so). C. Rotate forearm so that your palm faces towards your face. Rotate forearm so that your palm faces away from your face (grasp hand around wrist with other hand to do so). D. Fully straighten elbow. Fully bend elbow. 3. Continue light use of the hand progressing to more normal us as it feels comfortable to do so. 4. In 2 - 4 weeks you may discontinue using the brace (if you were using one) and resume normal use of the hand and wrist if you have regained full and painless motion and function. 5. If you are unable to achieve  full and painless motion and function over 4 weeks, please call the office at [phone] to schedule a follow-up appointment with Dr. Lee Mtz. Thank you for choosing [facility] for your Hand and Upper Extremity needs. If we can be of any further assistance to you, please do not hesitate to contact us.     Office Phone Number:  [phone]  or  [phone]

## 2021-12-17 NOTE — LETTER
[facility]  [facility] [address] [address]  Phone: [phone]  Fax: [phone]    Tasneem Matti        December 17, 2021     Patient: Georgina Yoon   YOB: 1995   Date of Visit: 12/17/2021       To Whom it May Concern:    Min Koehler was seen in my clinic on 12/17/2021. If you have any questions or concerns, please don't hesitate to call.     Sincerely,         Tawnya Velasquez PA-C

## 2022-03-29 ENCOUNTER — TELEPHONE (OUTPATIENT)
Dept: ORTHOPEDIC SURGERY | Age: 27
End: 2022-03-29

## 2022-03-29 NOTE — TELEPHONE ENCOUNTER
Imported medical records for 11/6/2021 to present into [facility] for [address] [facility]

## 2025-07-03 ENCOUNTER — HOSPITAL ENCOUNTER (EMERGENCY)
Age: 30
Discharge: HOME | End: 2025-07-03
Attending: EMERGENCY MEDICINE
Payer: COMMERCIAL

## 2025-07-03 VITALS
BODY MASS INDEX: 30.09 KG/M2 | HEART RATE: 58 BPM | WEIGHT: 214.9 LBS | SYSTOLIC BLOOD PRESSURE: 121 MMHG | DIASTOLIC BLOOD PRESSURE: 74 MMHG | TEMPERATURE: 97.8 F | HEIGHT: 71 IN | RESPIRATION RATE: 18 BRPM | OXYGEN SATURATION: 97 %

## 2025-07-03 DIAGNOSIS — R21 RASH: Primary | ICD-10-CM

## 2025-07-03 PROCEDURE — 99283 EMERGENCY DEPT VISIT LOW MDM: CPT

## 2025-07-03 PROCEDURE — 6370000000 HC RX 637 (ALT 250 FOR IP): Performed by: PHYSICIAN ASSISTANT

## 2025-07-03 RX ORDER — CEPHALEXIN 500 MG/1
500 CAPSULE ORAL ONCE
Status: COMPLETED | OUTPATIENT
Start: 2025-07-03 | End: 2025-07-03

## 2025-07-03 RX ORDER — PREDNISONE 20 MG/1
60 TABLET ORAL DAILY
Qty: 15 TABLET | Refills: 0 | Status: SHIPPED | OUTPATIENT
Start: 2025-07-03 | End: 2025-07-08

## 2025-07-03 RX ORDER — SULFAMETHOXAZOLE AND TRIMETHOPRIM 800; 160 MG/1; MG/1
1 TABLET ORAL ONCE
Status: COMPLETED | OUTPATIENT
Start: 2025-07-03 | End: 2025-07-03

## 2025-07-03 RX ORDER — SULFAMETHOXAZOLE AND TRIMETHOPRIM 400; 80 MG/1; MG/1
1 TABLET ORAL DAILY
Qty: 10 TABLET | Refills: 0 | Status: SHIPPED | OUTPATIENT
Start: 2025-07-03 | End: 2025-07-13

## 2025-07-03 RX ORDER — CEPHALEXIN 500 MG/1
500 CAPSULE ORAL 4 TIMES DAILY
Qty: 40 CAPSULE | Refills: 0 | Status: SHIPPED | OUTPATIENT
Start: 2025-07-03 | End: 2025-07-13

## 2025-07-03 RX ORDER — CETIRIZINE HYDROCHLORIDE 10 MG/1
10 TABLET ORAL DAILY
Qty: 30 TABLET | Refills: 0 | Status: SHIPPED | OUTPATIENT
Start: 2025-07-03 | End: 2025-08-02

## 2025-07-03 RX ADMIN — SULFAMETHOXAZOLE AND TRIMETHOPRIM 1 TABLET: 800; 160 TABLET ORAL at 22:55

## 2025-07-03 RX ADMIN — CEPHALEXIN 500 MG: 500 CAPSULE ORAL at 22:55

## 2025-07-03 ASSESSMENT — PAIN - FUNCTIONAL ASSESSMENT
PAIN_FUNCTIONAL_ASSESSMENT: 0-10
PAIN_FUNCTIONAL_ASSESSMENT: ACTIVITIES ARE NOT PREVENTED

## 2025-07-03 ASSESSMENT — PAIN DESCRIPTION - PAIN TYPE: TYPE: ACUTE PAIN

## 2025-07-03 ASSESSMENT — PAIN DESCRIPTION - FREQUENCY: FREQUENCY: CONTINUOUS

## 2025-07-03 ASSESSMENT — PAIN DESCRIPTION - ORIENTATION: ORIENTATION: LEFT

## 2025-07-03 ASSESSMENT — PAIN DESCRIPTION - DESCRIPTORS: DESCRIPTORS: BURNING

## 2025-07-03 ASSESSMENT — ENCOUNTER SYMPTOMS: RESPIRATORY NEGATIVE: 1

## 2025-07-03 ASSESSMENT — PAIN SCALES - GENERAL: PAINLEVEL_OUTOF10: 6

## 2025-07-03 ASSESSMENT — PAIN DESCRIPTION - ONSET: ONSET: ON-GOING

## 2025-07-03 ASSESSMENT — PAIN DESCRIPTION - LOCATION: LOCATION: FOOT

## 2025-07-04 NOTE — ED PROVIDER NOTES
ED Attending Attestation Note     Date of evaluation: 7/3/2025    This patient was seen by the advance practice provider.  I have seen and examined the patient, agree with the workup, evaluation, management and diagnosis. The care plan has been discussed.  My assessment reveals complaints of a rash.  Patient states for the last several weeks he has had a rash on both of his feet but over the last several days the rash has now become more diffuse.  He describes it as pruritic and nonpainful.  He denies any difficulty breathing or shortness of breath.  On arrival, patient is hemodynamically stable.  He has normal oropharynx and clear breath sounds bilaterally.  He has confluent rash to the dorsal aspects of the bilateral feet with secondary erythema to the left foot with small amount of serosanguineous drainage.  He otherwise has papular excoriations to the trunk and bilateral upper and lower extremities.  Will treat with steroids for the rash, antibiotics for the secondary cellulitis.       Peter Gregory MD  07/03/25 [id number]    
sounds: Normal breath sounds.   Musculoskeletal:      Cervical back: Neck supple.   Skin:     Findings: Rash present.      Comments: Excoriated rash to dorsal left foot with increased erythema and swelling. Plaque to dorsum of right foot.  Scattered blanchable papules to all extremities, torso, neck.  No lesions to oral mucosa, palms, soles. No vesicles. No sloughing of skin.   Neurological:      General: No focal deficit present.      Mental Status: He is alert and oriented to person, place, and time. Mental status is at baseline.   Psychiatric:         Mood and Affect: Mood normal.             Iris Lama, PASawyerC  07/03/25 [id number]

## 2025-07-04 NOTE — ED TRIAGE NOTES
Patient arrived in the ER with c/o rash on his left foot. Patient has poison oak all over his body. The rash on his foot is weeping fluids. Patient was seen at urgent care last week. Patient was told by urgent care that he has a fungus on his foot and was given a cream as treatment.

## (undated) DEVICE — Z DISCONTINUED USE 2272117 DRAPE SURG 3 QTR N INVASIVE 2 LAYR DISP

## (undated) DEVICE — SOLUTION IV IRRIG 250ML ST LF 0.9% SODIUM 2F7122

## (undated) DEVICE — PADDING UNDERCAST W4INXL4YD 100% COT CRIMPED FINISH WBRL II

## (undated) DEVICE — COVER LT HNDL BLU PLAS

## (undated) DEVICE — BANDAGE COMPR W2INXL5YD TAN BRTH SELF ADH WRP W/ HND TEAR

## (undated) DEVICE — SPLINT ORTH W4XL15IN LAYERED FBRGLS FOAM PD BRTH BK MOLD

## (undated) DEVICE — DRAPE HND W114XL142IN BLU POLYPR W O PCH FEN CRD AND TB HLDR

## (undated) DEVICE — GLOVE SURG SZ 65 L12IN FNGR THK79MIL GRN LTX FREE

## (undated) DEVICE — APPLICATOR MEDICATED 26 CC SOLUTION HI LT ORNG CHLORAPREP

## (undated) DEVICE — ZIMMER® STERILE DISPOSABLE TOURNIQUET CUFF WITH PLC, DUAL PORT, SINGLE BLADDER, 18 IN. (46 CM)

## (undated) DEVICE — C-ARM PACK: Brand: C-ARM COVER

## (undated) DEVICE — GOWN,SIRUS,POLYRNF,BRTHSLV,XL,30/CS: Brand: MEDLINE

## (undated) DEVICE — UNDERGLOVE SURG SZ 8 FNGR THK0.21MIL GRN LTX BEAD CUF

## (undated) DEVICE — GLOVE SURG SZ 75 CRM LTX FREE POLYISOPRENE POLYMER BEAD ANTI

## (undated) DEVICE — BANDAGE COMPR W4INXL15FT BGE E SGL LAYERED CLP CLSR

## (undated) DEVICE — GOWN SIRUS NONREIN XL W/TWL: Brand: MEDLINE INDUSTRIES, INC.

## (undated) DEVICE — SPONGE GZ W4XL4IN COT 12 PLY TYP VII WVN C FLD DSGN

## (undated) DEVICE — MINOR SET UP PK

## (undated) DEVICE — GLOVE SURG SZ 65 CRM LTX FREE POLYISOPRENE POLYMER BEAD ANTI

## (undated) DEVICE — DRESSING PETRO W3XL3IN OIL EMUL N ADH GZ KNIT IMPREG CELOS